# Patient Record
(demographics unavailable — no encounter records)

---

## 2024-10-18 NOTE — HISTORY OF PRESENT ILLNESS
[FreeTextEntry1] : 46 yo,[id number],  here for initial visit. She typically has normal, every 30 day periods, last 3-5 days and not heavy. This past month she had spotting fro 2 weeks after her period, then got her usual period. She is SA, does not use contraception. She is worried because her mother had uterine cancer and her maternal aunt, 0varian ca in her 50's as well as ovarian ca in [place]. None of them had genetic testing done. Her last pap was  and she has never had mammography.

## 2024-10-18 NOTE — PLAN
[FreeTextEntry1] : Well woman exam pap done mammo ordered rt in 1 year  intermenstrual spotting-rt for pelvic sono, tsh ordered, [facility]

## 2024-10-18 NOTE — PHYSICAL EXAM
[Chaperone Declined] : Patient declined chaperone [Appropriately responsive] : appropriately responsive [Alert] : alert [No Acute Distress] : no acute distress [Oriented x3] : oriented x3 [Examination Of The Breasts] : a normal appearance [No Masses] : no breast masses were palpable [Labia Majora] : normal [Labia Minora] : normal [Normal] : normal [Uterine Adnexae] : normal